# Patient Record
Sex: MALE | URBAN - NONMETROPOLITAN AREA
[De-identification: names, ages, dates, MRNs, and addresses within clinical notes are randomized per-mention and may not be internally consistent; named-entity substitution may affect disease eponyms.]

---

## 2021-02-02 ENCOUNTER — OFFICE VISIT (OUTPATIENT)
Dept: PRIMARY CARE CLINIC | Age: 21
End: 2021-02-02
Payer: COMMERCIAL

## 2021-02-02 VITALS
DIASTOLIC BLOOD PRESSURE: 72 MMHG | BODY MASS INDEX: 31.57 KG/M2 | SYSTOLIC BLOOD PRESSURE: 124 MMHG | OXYGEN SATURATION: 98 % | RESPIRATION RATE: 18 BRPM | HEIGHT: 74 IN | HEART RATE: 67 BPM | WEIGHT: 246 LBS | TEMPERATURE: 98.2 F

## 2021-02-02 DIAGNOSIS — R11.0 NAUSEA: ICD-10-CM

## 2021-02-02 DIAGNOSIS — R52 ACHES: Primary | ICD-10-CM

## 2021-02-02 DIAGNOSIS — Z11.59 SPECIAL SCREENING EXAMINATION FOR VIRAL DISEASE: ICD-10-CM

## 2021-02-02 PROCEDURE — 99213 OFFICE O/P EST LOW 20 MIN: CPT | Performed by: NURSE PRACTITIONER

## 2021-02-02 PROCEDURE — 87804 INFLUENZA ASSAY W/OPTIC: CPT | Performed by: NURSE PRACTITIONER

## 2021-02-02 RX ORDER — ONDANSETRON 4 MG/1
4 TABLET, ORALLY DISINTEGRATING ORAL 3 TIMES DAILY PRN
Qty: 9 TABLET | Refills: 1 | Status: SHIPPED | OUTPATIENT
Start: 2021-02-02 | End: 2021-02-05

## 2021-02-02 ASSESSMENT — ENCOUNTER SYMPTOMS
WHEEZING: 0
SHORTNESS OF BREATH: 0
NAUSEA: 1
VOMITING: 0
CONSTIPATION: 0
DIARRHEA: 0
ABDOMINAL PAIN: 1

## 2021-02-02 NOTE — PROGRESS NOTES
Abdoulaye Lentz (:  2000) is a 21 y.o. male,New patient, here for evaluation of the following chief complaint(s):  Abdominal Pain (x 3 days), Fatigue (x 3 days), Generalized Body Aches (x 3 days), and Covid Testing (Patient states he has household exposure)       ASSESSMENT/PLAN:  1. Aches  -     POCT Influenza A/B  2. Nausea  3. Special screening examination for viral disease      Return if symptoms worsen or fail to improve. SYMPTOMATIC COVID SCREEN . You were screened for COVID today and swabbed. You will be called with the results and any indicated treatments. You were provided with written [facility] isolation/quarantine guidelines. Edy Mckeon will call with results. Based on the clinical exam findings and patient's reported symptoms, I do not suspect acute abdomen at this time. Gastroenteritis based on the physical exam findings. Encouraged to keep self hydrated by increasing fluid intake as discussed. You may have been prescribed medication to help alleviate nausea symptoms. Please keep your diet light or do not consume dairy or greasy foods. Advance your diet as tolerated. Patient agreeable to treatment plan. Educational materials provided on AVS.  Follow up if symptoms do not improve/worsen in the office or ER if applicable, otherwise follow up with your Primary Provider. SUBJECTIVE/OBJECTIVE:  ABD pain, chills, congestion and nausea for around the past 3 days. Afebrile. Notes has COVID around 90 days prior.      Abdominal Pain This is a new problem. The current episode started in the past 7 days. The onset quality is gradual. The problem occurs intermittently. The problem has been unchanged. The pain is located in the generalized abdominal region. The pain is mild. The quality of the pain is aching and cramping. The abdominal pain does not radiate. Associated symptoms include nausea. Pertinent negatives include no constipation, diarrhea, hematuria or vomiting. Associated symptoms comments: Body aches, nausea. Nothing aggravates the pain. The pain is relieved by nothing. He has tried nothing for the symptoms. Fatigue  Associated symptoms include abdominal pain, fatigue and nausea. Pertinent negatives include no vomiting. Generalized Body Aches  Associated symptoms include abdominal pain, fatigue and nausea. Pertinent negatives include no vomiting. Review of Systems   Constitutional: Positive for fatigue. Respiratory: Negative for shortness of breath and wheezing. Gastrointestinal: Positive for abdominal pain and nausea. Negative for constipation, diarrhea and vomiting. Genitourinary: Negative for hematuria. Physical Exam  Vitals signs and nursing note reviewed. Constitutional:       General: He is not in acute distress. Appearance: Normal appearance. He is not ill-appearing. HENT:      Head: Normocephalic. Right Ear: External ear normal.      Left Ear: External ear normal.   Eyes:      Pupils: Pupils are equal, round, and reactive to light. Cardiovascular:      Rate and Rhythm: Normal rate and regular rhythm. Heart sounds: Normal heart sounds. Pulmonary:      Effort: Pulmonary effort is normal. No respiratory distress. Breath sounds: Normal breath sounds. No stridor. No wheezing or rhonchi. Abdominal:      General: Bowel sounds are increased. Palpations: Abdomen is soft. Tenderness: There is no abdominal tenderness. Skin:     General: Skin is warm and dry.    Neurological: Mental Status: He is alert and oriented to person, place, and time. An electronic signature was used to authenticate this note.     --HARRY Duggan - CNP

## 2021-02-02 NOTE — PATIENT INSTRUCTIONS
SYMPTOMATIC COVID SCREEN . You were screened for COVID today and swabbed. You will be called with the results and any indicated treatments. You were provided with written [facility] isolation/quarantine guidelines. [facility] will call with results. Based on the clinical exam findings and patient's reported symptoms, I do not suspect acute abdomen at this time. Gastroenteritis based on the physical exam findings. Encouraged to keep self hydrated by increasing fluid intake as discussed. You may have been prescribed medication to help alleviate nausea symptoms. Please keep your diet light or do not consume dairy or greasy foods. Advance your diet as tolerated. Patient agreeable to treatment plan. Educational materials provided on AVS.  Follow up if symptoms do not improve/worsen in the office or ER if applicable, otherwise follow up with your Primary Provider.

## 2021-02-04 ENCOUNTER — TELEPHONE (OUTPATIENT)
Dept: PRIMARY CARE CLINIC | Age: 21
End: 2021-02-04

## 2023-08-10 ENCOUNTER — OFFICE VISIT (OUTPATIENT)
Age: 23
End: 2023-08-10
Payer: COMMERCIAL

## 2023-08-10 VITALS
SYSTOLIC BLOOD PRESSURE: 128 MMHG | HEIGHT: 73 IN | RESPIRATION RATE: 20 BRPM | HEART RATE: 75 BPM | DIASTOLIC BLOOD PRESSURE: 80 MMHG | WEIGHT: 226 LBS | TEMPERATURE: 97.1 F | OXYGEN SATURATION: 97 % | BODY MASS INDEX: 29.95 KG/M2

## 2023-08-10 DIAGNOSIS — Z72.51 HIGH RISK SEXUAL BEHAVIOR, UNSPECIFIED TYPE: Primary | ICD-10-CM

## 2023-08-10 LAB
C TRACH DNA UR QL NAA+PROBE: NOT DETECTED
N GONORRHOEA DNA UR QL NAA+PROBE: NOT DETECTED
T VAGINALIS DNA UR QL NAA+PROBE: NOT DETECTED

## 2023-08-10 PROCEDURE — 99213 OFFICE O/P EST LOW 20 MIN: CPT | Performed by: NURSE PRACTITIONER

## 2023-08-10 ASSESSMENT — ENCOUNTER SYMPTOMS
COUGH: 0
SHORTNESS OF BREATH: 0
ABDOMINAL PAIN: 0
EYE PAIN: 0
TROUBLE SWALLOWING: 0
WHEEZING: 0
ABDOMINAL DISTENTION: 0
SINUS PRESSURE: 0
EYE DISCHARGE: 0
STRIDOR: 0
CHEST TIGHTNESS: 0
SORE THROAT: 0
COLOR CHANGE: 0

## 2023-08-10 NOTE — PROGRESS NOTES
[address]  [address] [address]  Dept: [phone]  Dept Fax: [phone]  Loc: [phone]    Sylvester Ndiaye is a 25 y.o. male who presents today for his medical conditions/complaints as noted below. Sylvester Ndiaye is complaining of Sexually Transmitted Diseases (screening)        HPI:   Sexually Transmitted Diseases  Pertinent negatives include no abdominal pain, chest pain, chills, coughing, dysuria, fever, rash, shortness of breath or sore throat. Librado Hope presents to the office complaining of chlamydia exposure. He denies any symptoms. Patient last had intercourse with the individual about 2 weeks ago. History reviewed. No pertinent past medical history. No past surgical history on file. No family history on file. Social History     Tobacco Use    Smoking status: Never    Smokeless tobacco: Never   Substance Use Topics    Alcohol use: Not on file        No current outpatient medications on file. No current facility-administered medications for this visit. Allergies   Allergen Reactions    Pcn [Penicillins]        Health Maintenance   Topic Date Due    COVID-19 Vaccine (1) Never done    Varicella vaccine (1 of 2 - 2-dose childhood series) Never done    HPV vaccine (1 - Male 2-dose series) Never done    Depression Screen  Never done    HIV screen  Never done    Hepatitis C screen  Never done    DTaP/Tdap/Td vaccine (1 - Tdap) Never done    Flu vaccine (1) Never done    Hepatitis A vaccine  Aged Out    Hib vaccine  Aged Out    Meningococcal (ACWY) vaccine  Aged Out    Pneumococcal 0-64 years Vaccine  Aged Out       Subjective:   Review of Systems   Constitutional:  Negative for chills, fatigue and fever. HENT:  Negative for congestion, sinus pressure, sore throat and trouble swallowing. Eyes:  Negative for pain and discharge.    Respiratory:  Negative for cough, chest tightness, shortness of

## 2023-08-10 NOTE — PATIENT INSTRUCTIONS
STD test pending   No sex until results are back  Follow-up with PCP as needed      Patient verbalized understanding and agrees to plan.